# Patient Record
Sex: FEMALE | Race: WHITE | NOT HISPANIC OR LATINO | Employment: OTHER | URBAN - METROPOLITAN AREA
[De-identification: names, ages, dates, MRNs, and addresses within clinical notes are randomized per-mention and may not be internally consistent; named-entity substitution may affect disease eponyms.]

---

## 2017-05-02 ENCOUNTER — ALLSCRIPTS OFFICE VISIT (OUTPATIENT)
Dept: OTHER | Facility: OTHER | Age: 82
End: 2017-05-02

## 2017-10-10 ENCOUNTER — HOSPITAL ENCOUNTER (OUTPATIENT)
Dept: NON INVASIVE DIAGNOSTICS | Facility: CLINIC | Age: 82
Discharge: HOME | End: 2017-10-10
Payer: COMMERCIAL

## 2017-10-10 DIAGNOSIS — I73.9 PERIPHERAL VASCULAR DISEASE (HCC): ICD-10-CM

## 2017-10-10 PROCEDURE — 93925 LOWER EXTREMITY STUDY: CPT

## 2017-10-10 PROCEDURE — 93923 UPR/LXTR ART STDY 3+ LVLS: CPT

## 2017-10-10 PROCEDURE — 93978 VASCULAR STUDY: CPT

## 2017-10-12 ENCOUNTER — ALLSCRIPTS OFFICE VISIT (OUTPATIENT)
Dept: OTHER | Facility: OTHER | Age: 82
End: 2017-10-12

## 2017-10-13 NOTE — PROGRESS NOTES
Assessment  1  Asymptomatic bilateral carotid artery stenosis (433 10,433 30) (I65 23)  2  Bilateral edema of lower extremity (782 3) (R60 0)  3  Peripheral arterial disease (443 9) (I73 9)    Plan  Peripheral arterial disease    · VAS ABDOMINAL AORTA/ILIACS; COMPLETE STUDY; Status:Hold For - Scheduling;  Requested for:12Oct2018; Perform:[facility]; VPO:[id number];CXCWDLP; For:Peripheral arterial   disease; Ordered By:Tosin Vogt;   · VAS LOWER LIMB ARTERIAL DUPLEX, COMPLETE BILATERAL/GRAFTS; SIDE:Bilateral;  Status:Hold For - Scheduling; Requested for:12Oct2018; Perform:[facility]; RJM:[id number];PCBTBRW; For:Peripheral arterial   disease; Ordered By:Kevin Vogt;   · Follow-up visit in 1 year Evaluation and Treatment  Follow-up  Status: Hold For -  Scheduling  Requested for: 12Oct2017  Ordered; For: Peripheral arterial disease;  Ordered By: Kevin Vogt  Performed:   Due:   04IFI6868    Discussion/Summary  Discussion Summary:   Patient is a [de-identified] yo F w/ PAD s/p B fem-BK popliteal bypasses w/ in situ GSV as well as R femoral endarterectomy, and B EIA stent who presents to review recent imaging and RFMPADclaudication, rest pain, ulcerationsbypasses widely patent on DU; ABIs: 0 88/0 92; reviewed LEADs and AOIL; moderate L DESMOND stenosisnot pursue further intervention at this time 2/2 lack of symptoms and CKDAOIL and LEADs in 1 yearBLE venous insufficiencyvery compliant with compression stockings; please continue to wear these daily and elevate your legs whenever possible to improve swelling; use at least a weight of 15-20mmHg but you could go up to 20-30mmHg   Try buying a solid color stocking, either [employer] or [employer] brand as these are reliable, high quality stockings; put them on in the morning as soon as you wake upB asymptomatic ICA stenosis < 50%; with high grade ECA stenosis; discussed recommended yearly DU monitoring; however, patient is not interested in having carotid intervention at any time 2/2 aunt who had CVA associated with this procedure; she understands the risk of stroke if she developed high-grade stenosis; no further monitoringMedicationsASA, statin for best medical managementf/u in 1 year  Counseling Documentation With Imm: The patient was counseled regarding diagnostic results,-instructions for management,-prognosis,-patient and family education,-importance of compliance with treatment  Chief Complaint  Chief Complaint Free Text Note Form:  I am here to review my test results  Span is here to review her VIN/AOIL she had on 10/10/2017  Pt c/o swelling in both legs with the right being worse  Pt does wear compression stockings  Pt c/o discomfort from the swelling in her legs  She states the discomfort comes and goes depending on how swollen her legs get  Pt denies open wounds or sores  History of Present Illness  HPI: Patient is a [de-identified] yo F w/ PAD s/p B fem-BK popliteal bypasses (both in '12) w/ in situ GSV as well as R femoral endarterectomy (concurrent with bypass) , and B EIA stent who presents to review recent imaging and RFM  denies any claudication with walking  She walks slowly with a cane but can walk as far as she likes and is active, performed errands  She has significant edema of the legs but wears compression stockings daily and elevates whenever possible  Swelling is worse in the evening  She has a corn that appears to be a wart on her R plantar foot that she would like to get removed  She denies any hx of CVA or recent symptoms  takes ASA and statin daily  She is a non-smoker      Review of Systems  Complete Female - Vasc:   Constitutional: No fever or chills, feels well, no tiredness, no recent weight gain or weight loss  Eyes: dryness of the eyes,-no sudden vision loss-and-no double vision, but-no eye pain,-no eyesight problems,-eyes not red,-no purulent discharge from the eyes,-no itching of the eyes,-wears glasses-and-blurred vision  ENT: hoarseness, but-no earache,-no nosebleeds,-no sore throat,-no hearing loss-and-no nasal discharge  Cardiovascular: no painful veins,-palpitations,-no leg pain with walking-and-no bleeding veins, but-regular heart rate,-no chest pain-and-no intermittent leg claudication  Respiratory: no shortness of breath,-no wheezing,-no shortness of breath during exertion,-no orthopnea-and-no complaints of PND, but-cough  Gastrointestinal: No nausea, No vomiting, no diarrhea, no blood in stool  Genitourinary: no dysuria, no Hematuria,no urinary incontinence  Musculoskeletal: no lumbar pain,-joint stiffness-and-limb swelling, but-no joint swelling,-no limb pain-and-no myalgias  Integumentary: itching,-dry skin-and-no ulcers, but-no rashes,-no skin lesions-and-no skin wound  Neurological: no dementia-and-difficulty walking, but-no headache,-no numbness,-no confusion,-no dizziness,-no limb weakness,-no convulsions-and-no fainting  Psychiatric: anxiety-and-no mood disorder, but-no depression  Hematologic/Lymphatic: no bleeding disorder, no easy bruising  ROS Reviewed:   ROS reviewed  Active Problems  1  Abdominal aortic atherosclerosis (440 0) (I70 0)  2  Asymptomatic bilateral carotid artery stenosis (433 10,433 30) (I65 23)  3  Bilateral edema of lower extremity (782 3) (R60 0)  4  Chronic diastolic congestive heart failure (428 32,428 0) (I50 32)  5  Chronic kidney disease, stage 3 (585 3) (N18 3)  6  Dyslipidemia (272 4) (E78 5)  7  Hypertension (401 9) (I10)  8  Hypothyroidism (244 9) (E03 9)  9  Non-healing Surgical Wound (998 83)  10  Peripheral arterial disease (443 9) (I73 9)  11  Premature atrial contractions (427 61) (I49 1)    Past Medical History  Active Problems And Past Medical History Reviewed: The active problems and past medical history were reviewed and updated today  Surgical History  1  History of Bypass Graft Using Vein: Femoral-popliteal  2  History of Hemorrhoidectomy  3  History of Hysterectomy  4  History of Laminectomy Lumbar  5  History of Ovarian Cystectomy  6  History of Pilonidal Cyst Resection  7  History of PTA Iliac Initial Stenosis With Stent  Surgical History Reviewed: The surgical history was reviewed and updated today  Family History  Mother   1  Family history of Congestive Heart Failure  Father   2  Family history of Aortic Aneurysm  Family History Reviewed: The family history was reviewed and updated today  Social History   · Former smoker (V10 87) (T46 901)  Social History Reviewed: The social history was reviewed and updated today  The social history was reviewed and is unchanged  Current Meds  1  ALPRAZolam 0 25 MG Oral Tablet; PRN; Therapy: 46Yds9559 to (Evaluate:06Feb2014)  Requested for: 37KSQ0381 Recorded  2  Aspirin EC 81 MG Oral Tablet Delayed Release; TAKE 1 TABLET DAILY AS DIRECTED; Therapy: (Recorded:11Jan2013) to Recorded  3  Fiber Formula Oral Capsule; Therapy: 53ZMJ9552 to Recorded  4  Fish Oil 1000 MG Oral Capsule; TAKE 2 CAPSULE Daily; Therapy: (Recorded:17Lad8642) to Recorded  5  Furosemide 20 MG Oral Tablet; 1 PO BID; Therapy: 62LQV8791 to (Evaluate:11Apr2013)  Requested for: 54DRZ5524 Recorded  6  Lisinopril 40 MG Oral Tablet; TAKE 1 TABLET DAILY; Therapy: 54BBU1232 to (Evaluate:06Jan2014)  Requested for: 27EXH7694 Recorded  7  Multivitamins TABS; TAKE 1 TABLET DAILY; Therapy: 82FGS7682 to Recorded  8  Omega 3 1200 MG Oral Capsule; Therapy: 19ELV0888 to Recorded  9  Simvastatin 40 MG Oral Tablet; TAKE 1 TABLET Bedtime; Therapy: 95ALS7558 to (Evaluate:28Jun2015)  Requested for: 85IUU9395; Last   Rx:52Pth3174 Ordered  10  Spironolactone 25 MG Oral Tablet; TAKE 1 TABLET DAILY; Therapy: 32MHO2462 to (Evaluate:06Jan2014)  Requested for: 18IAA9848 Recorded  11  Synthroid 75 MCG Oral Tablet; TAKE 1 TABLET DAILY; Therapy: (Recorded:60Vbf4626) to Recorded  12  Zantac 150 Maximum Strength TABS; PRN;     Therapy: (Recorded:60Uiu9395) to Recorded  Medication List Reviewed: The medication list was reviewed and updated today  Allergies  1  Beta Adrenergic Blockers  2  HydroCHLOROthiazide TABS  3  Penicillins    Vitals  Vital Signs    Recorded: 75WTU9418 10:11AM   Temperature 97 4 F1   Heart Rate 74, L Radial1   Respiration Quality Normal1   Respiration 953   Systolic 981, LUE, Sitting1   Diastolic 78, LUE, Sitting1   Height 5 ft 5 in1   Weight 154 lb 6 oz1   BMI Calculated 25 691   BSA Calculated 1 771        1  Amended By: DoctorShari; Oct 12 2017 11:05 AM EST   Physical Exam    Carotid: no bruit heard on the right-and-no bruit on the left  Radial: right 2+-and-left 2+  Femoral: right 2+-and-left 2+  Popliteal: right 0-and-left 0  Posterior tibialis: right 0-and-left 0  Dorsalis pedis: right 0-and-left 0  Distal Pulse Exam:   There was a palpable graft pulse at   2+ R graft, 1+ L graft at knee  Normal Capillary Refill  Extremities: bilateral ankle 2+ pitting edema,-bilateral pretibial 2+ pitting edema-and-bilateral foot 1+ pitting edema  LE Varicose Veins: right leg reticular veins,-left leg reticular veins,-right leg spider veins-and-left leg spider veins  The heart rate was normal  The rhythm was regular  Heart sounds: normal S1-and-normal S2    Murmurs: No murmurs were heard  Pulmonary   Respiratory effort: No increased work of breathing or signs of respiratory distress  Auscultation of lungs: Clear to auscultation  No wheezing, no rales, no rhonchi  Abdomen   Abdomen: Abdomen soft, non-tender, no masses, non distended, no rebound tenderness  Psychiatric   Orientation to person, place and time: Normal    Mood and affect: Normal    Eyes   Conjunctiva and lids: No swelling, erythema, or discharge  Ears, Nose, Mouth, and Throat   Hearing: Normal    Neck   Neck: No jugular venous distention, normal ROM and extension  No cervical adenopathy, trachea midline, neck supple     Neurologic Sensory exam normal   Motor skills intact  Musculoskeletal   Gait and station:-with cane  Skin   Skin and subcutaneous tissue: -(small lesion appears to be a wart on R plantar foot at 1st met base without ulcer or skin breakdown)      Signatures   Electronically signed by : Brian Montoya MD; Oct 12 2017 10:08AM EST                       (Author)    Electronically signed by : Brian Montoya MD; Oct 12 2017 11:05AM EST                       (Author)

## 2017-11-20 ENCOUNTER — ALLSCRIPTS OFFICE VISIT (OUTPATIENT)
Dept: OTHER | Facility: OTHER | Age: 82
End: 2017-11-20

## 2017-11-21 NOTE — PROGRESS NOTES
Assessment  Assessed    1  Hypertension (401 9) (I10)   2  Dyslipidemia (272 4) (E78 5)   3  Chronic diastolic congestive heart failure (428 32,428 0) (I50 32)   4  Peripheral arterial disease (443 9) (I73 9)   5  Asymptomatic bilateral carotid artery stenosis (433 10,433 30) (I65 23)   6  Abdominal aortic atherosclerosis (440 0) (I70 0)   7  Premature atrial contractions (427 61) (I49 1)    Plan  Chronic diastolic congestive heart failure, Dyslipidemia, Hypertension    · Follow-up visit in 6 months Evaluation and Treatment  Follow-up  Status: Hold For -Scheduling  Requested for: 20Nov2017   Ordered;Chronic diastolic congestive heart failure, Dyslipidemia, Hypertension; Ordered By: Jerome Perry Performed:  Due: 34UBH1684    Discussion/Summary  Cardiology Discussion Summary Free Text Note Form [facility]:   Mrs Chen Palomino is a pleasant 60-year-old female who presents to the office today for routine follow-up  Since her last visit she has been feeling well and offers no cardiac complaints today  blood pressure is well controlled in the office today  No changes were made to her antihypertensive medication regimen  most recent lipids were reviewed  In light of her vascular disease her LDL is suboptimal  She had nausea with atorvastatin  I will wait for reassessment in the near future and if her LDL remains suboptimal I did discuss changing to rosuvastatin  terms of her heart failure, volume status appears stable  She will remain on her current diuretic regimen  She was instructed she can utilize additional Lasix as needed for signs or symptoms of volume overload  She continues to dialyze compression stockings  changes were made to her medication regimen  No testing is advised  follow-up in the office in six months or sooner if deemed necessary        History of Present Illness  Cardiology HPI Free Text Note Form [facility]: Mrs Chen Palomino is a pleasant 60-year-old female who presents to the office today for routine follow-up   her last visit she has been feeling well  She is sedentary but able to complete modest housework without any chest discomfort or shortness of breath  She denies any signs or symptoms of congestive heart failure including progressive lower extremity edema, paroxysmal nocturnal dyspnea, orthopnea, weight gain or increasing abdominal girth  She does not weigh herself regularly  She sleeps with her legs propped on pillows and notes they look well in the morning but does note increased edema throughout the day  She wears compression stockings which helps the edema  She recently changed to a 15-20 mmHg stocking which has helped  She reports an occasional fluttering sensation in her chest  She denies symptoms of claudication  Review of Systems  Cardiology Female ROS:    Cardiac: as noted in HPI  Skin: No complaints of nonhealing sores or skin rash  Genitourinary: No complaints of recurrent urinary tract infections, frequent urination at night, difficult urination, blood in urine, kidney stones, loss of bladder control, kidney problems, denies any birth control or hormone replacement, is not post menopausal, not currently pregnant  Psychological: as noted in HPI  General: as noted in HPI  Respiratory: as noted in HPI  HEENT: No complaints of serious problems, hearing problems, nose problems, throat problems, or snoring  Gastrointestinal: No complaints of liver problems, nausea, vomiting, heartburn, constipation, bloody stools, diarrhea, problems swallowing, adbominal pain, or rectal bleeding  Hematologic: No complaints of bleeding disorders, anemia, blood clots, or excessive brusing  Neurological: No complaints of numbness, tingling, dizziness, weakness, seizures, headaches, syncope or fainting, AM fatigue, daytime sleepiness, no witnessed apnea episodes  Musculoskeletal: No complaints of arthritis, back pain, or painfull swelling  Active Problems  Problems    1   Abdominal aortic atherosclerosis (440 0) (I70 0)   2  Asymptomatic bilateral carotid artery stenosis (433 10,433 30) (I65 23)   3  Bilateral edema of lower extremity (782 3) (R60 0)   4  Chronic diastolic congestive heart failure (428 32,428 0) (I50 32)   5  Chronic kidney disease, stage 3 (585 3) (N18 3)   6  Dyslipidemia (272 4) (E78 5)   7  Hypertension (401 9) (I10)   8  Hypothyroidism (244 9) (E03 9)   9  Non-healing Surgical Wound (998 83)   10  Peripheral arterial disease (443 9) (I73 9)   11  Premature atrial contractions (427 61) (I49 1)    Past Medical History  Active Problems And Past Medical History Reviewed: The active problems and past medical history were reviewed and updated today  Surgical History  Problems    1  History of Bypass Graft Using Vein: Femoral-popliteal   2  History of Hemorrhoidectomy   3  History of Hysterectomy   4  History of Laminectomy Lumbar   5  History of Ovarian Cystectomy   6  History of Pilonidal Cyst Resection   7  History of PTA Iliac Initial Stenosis With Stent  Surgical History Reviewed: The surgical history was reviewed and updated today  Family History  Mother    1  Family history of Congestive Heart Failure  Father    2  Family history of Aortic Aneurysm  Family History Reviewed: The family history was reviewed and updated today  Social History  Problems    · Former smoker (H74 80) (M81 150)  Social History Reviewed: The social history was reviewed and updated today  Current Meds   1  ALPRAZolam 0 25 MG Oral Tablet; PRN; Therapy: 83Rbm5415 to (Evaluate:00Mja5940)  Requested for: 25ZLR1110 Recorded   2  Aspirin EC 81 MG Oral Tablet Delayed Release; TAKE 1 TABLET DAILY AS DIRECTED; Therapy: (Recorded:63Gbc6050) to Recorded   3  Fiber Formula Oral Capsule; Therapy: 20YSU8499 to Recorded   4  Fish Oil 1000 MG Oral Capsule; TAKE 2 CAPSULE Daily; Therapy: (Recorded:63Scx8888) to Recorded   5  Furosemide 20 MG Oral Tablet; 1 PO BID;  Therapy: 03KQU4806 to (Evaluate:99Xqz2101)  Requested for: 62ZCC7218 Recorded   6  Lisinopril 40 MG Oral Tablet; TAKE 1 TABLET DAILY; Therapy: 32ZFP5640 to (Evaluate:06Jan2014)  Requested for: 01PSQ6543 Recorded   7  Multivitamins TABS; TAKE 1 TABLET DAILY; Therapy: 19QDH4895 to Recorded   8  Omega 3 1200 MG Oral Capsule; Therapy: 34BHD0795 to Recorded   9  Simvastatin 40 MG Oral Tablet; TAKE 1 TABLET Bedtime; Therapy: 74CXN9915 to (Evaluate:28Jun2015)  Requested for: 01STW0165; Last Rx:66Dgf0086 Ordered   10  Spironolactone 25 MG Oral Tablet; TAKE 1 TABLET DAILY; Therapy: 31RJM1402 to (Evaluate:06Jan2014)  Requested for: 77JDS8116 Recorded   11  Synthroid 75 MCG Oral Tablet; TAKE 1 TABLET DAILY; Therapy: (Recorded:09Qus2798) to Recorded   12  Zantac 150 Maximum Strength TABS; PRN; Therapy: (Recorded:55Yjy2093) to Recorded    Allergies  Medication    1  Beta Adrenergic Blockers   2  HydroCHLOROthiazide TABS   3  Penicillins    Vitals  Vital Signs    Recorded: 20Nov2017 10:41AM   Heart Rate 73   Systolic 953   Diastolic 62   Weight 920 lb 4 oz   BMI Calculated 25 67   BSA Calculated 1 77       Physical Exam   Constitutional  General appearance: No acute distress, well appearing and well nourished  Eyes  Conjunctiva and Sclera examination: Conjunctiva pink, sclera anicteric  Ears, Nose, Mouth, and Throat - Oropharynx: Clear, nares are clear, mucous membranes are moist   Neck  Neck and thyroid: Normal, supple, trachea midline, no thyromegaly  Pulmonary  Respiratory effort: No increased work of breathing or signs of respiratory distress  Auscultation of lungs: Clear to auscultation, no rales, no rhonchi, no wheezing, good air movement  Cardiovascular  Auscultation of heart: Normal rate and rhythm, normal S1 and S2, no murmurs  Carotid pulses: Normal, 2+ bilaterally  Peripheral vascular exam: Normal pulses throughout, no tenderness, erythema or swelling  Pedal pulses: Normal, 2+ bilaterally     Examination of extremities for edema and/or varicosities: Abnormal   bilateral pretibial 1+ pitting edema  Abdomen  Abdomen: Non-tender and no distention  Liver and spleen: No hepatomegaly or splenomegaly  Musculoskeletal Gait and station: Abnormal  Gait evaluation demonstrated ambulates with a cane  -- Digits and nails: Normal without clubbing or cyanosis  -- Inspection/palpation of joints, bones, and muscles: Normal, ROM normal    Skin - Skin and subcutaneous tissue: Normal without rashes or lesions  Skin is warm and well perfused, normal turgor  Neurologic - Cranial nerves: II - XII intact  -- Speech: Normal    Psychiatric - Orientation to person, place, and time: Normal -- Mood and affect: Normal       Results/Data  ECG Report: A 12 lead ECG was performed and was normal   Rhythm and rate:  normal sinus rhythm        Signatures   Electronically signed by : Umm Maldonado DO; Nov 20 2017 11:08AM EST                       (Author)

## 2018-01-12 VITALS
DIASTOLIC BLOOD PRESSURE: 78 MMHG | HEIGHT: 65 IN | BODY MASS INDEX: 25.72 KG/M2 | TEMPERATURE: 97.4 F | WEIGHT: 154.38 LBS | HEART RATE: 74 BPM | SYSTOLIC BLOOD PRESSURE: 132 MMHG | RESPIRATION RATE: 18 BRPM

## 2018-01-12 NOTE — CONSULTS
I had the pleasure of evaluating your patient, BERNICE LEBLANC  My full evaluation follows:      History of Present Illness  Mrs Leblanc is a pleasant 42-year-old female who presents to the office today for routine follow-up  Since her last visit she has been feeling well  She is sedentary but able to complete modest housework without any chest discomfort or shortness of breath  She denies any signs or symptoms of congestive heart failure including progressive lower extremity edema, paroxysmal nocturnal dyspnea, orthopnea, weight gain or increasing abdominal girth  She does not weigh herself regularly  She sleeps with her legs propped on pillows and notes they look well in the morning but does note increased edema throughout the day  She wears compression stockings which helps the edema  She recently changed to a 15-20 mmHg stocking which has helped  She reports an occasional fluttering sensation in her chest  She denies symptoms of claudication  Review of Systems      Cardiac: as noted in HPI  Skin: No complaints of nonhealing sores or skin rash  Genitourinary: No complaints of recurrent urinary tract infections, frequent urination at night, difficult urination, blood in urine, kidney stones, loss of bladder control, kidney problems, denies any birth control or hormone replacement, is not post menopausal, not currently pregnant  Psychological: as noted in HPI  General: as noted in HPI  Respiratory: as noted in HPI  HEENT: No complaints of serious problems, hearing problems, nose problems, throat problems, or snoring  Gastrointestinal: No complaints of liver problems, nausea, vomiting, heartburn, constipation, bloody stools, diarrhea, problems swallowing, adbominal pain, or rectal bleeding  Hematologic: No complaints of bleeding disorders, anemia, blood clots, or excessive brusing     Neurological: No complaints of numbness, tingling, dizziness, weakness, seizures, headaches, syncope or fainting, AM fatigue, daytime sleepiness, no witnessed apnea episodes  Musculoskeletal: No complaints of arthritis, back pain, or painfull swelling  Active Problems    1  Abdominal aortic atherosclerosis (440 0) (I70 0)   2  Asymptomatic bilateral carotid artery stenosis (433 10,433 30) (I65 23)   3  Bilateral edema of lower extremity (782 3) (R60 0)   4  Chronic diastolic congestive heart failure (428 32,428 0) (I50 32)   5  Chronic kidney disease, stage 3 (585 3) (N18 3)   6  Dyslipidemia (272 4) (E78 5)   7  Hypertension (401 9) (I10)   8  Hypothyroidism (244 9) (E03 9)   9  Non-healing Surgical Wound (998 83)   10  Peripheral arterial disease (443 9) (I73 9)   11  Premature atrial contractions (427 61) (I49 1)    Past Medical History    The active problems and past medical history were reviewed and updated today  Surgical History    · History of Bypass Graft Using Vein: Femoral-popliteal   · History of Hemorrhoidectomy   · History of Hysterectomy   · History of Laminectomy Lumbar   · History of Ovarian Cystectomy   · History of Pilonidal Cyst Resection   · History of PTA Iliac Initial Stenosis With Stent    The surgical history was reviewed and updated today  Family History    · Family history of Congestive Heart Failure    · Family history of Aortic Aneurysm    The family history was reviewed and updated today  Social History    · Former smoker (G23 63) (I95 100)  The social history was reviewed and updated today  Current Meds   1  ALPRAZolam 0 25 MG Oral Tablet; PRN; Therapy: 69Usn1750 to (Evaluate:53Yiy0733)  Requested for: 19XKU3047 Recorded   2  Aspirin EC 81 MG Oral Tablet Delayed Release; TAKE 1 TABLET DAILY AS DIRECTED; Therapy: (Recorded:11Jan2013) to Recorded   3  Fiber Formula Oral Capsule; Therapy: 56SHY2907 to Recorded   4  Fish Oil 1000 MG Oral Capsule; TAKE 2 CAPSULE Daily; Therapy: (Recorded:14Eeo1361) to Recorded   5  Furosemide 20 MG Oral Tablet; 1 PO BID;    Therapy: 38PKB2812 to (Evaluate:11Apr2013)  Requested for: 60IBM8913 Recorded   6  Lisinopril 40 MG Oral Tablet; TAKE 1 TABLET DAILY; Therapy: 28LTX7349 to (Evaluate:06Jan2014)  Requested for: 23CWZ3812 Recorded   7  Multivitamins TABS; TAKE 1 TABLET DAILY; Therapy: 23TLB1605 to Recorded   8  Omega 3 1200 MG Oral Capsule; Therapy: 15AYN9843 to Recorded   9  Simvastatin 40 MG Oral Tablet; TAKE 1 TABLET Bedtime; Therapy: 25HTC5205 to (Evaluate:28Jun2015)  Requested for: 84XFG2876; Last   Rx:31Dhk4706 Ordered   10  Spironolactone 25 MG Oral Tablet; TAKE 1 TABLET DAILY; Therapy: 79QOC9435 to (Evaluate:06Jan2014)  Requested for: 89ELU8715 Recorded   11  Synthroid 75 MCG Oral Tablet; TAKE 1 TABLET DAILY; Therapy: (Recorded:14Imm0998) to Recorded   12  Zantac 150 Maximum Strength TABS; PRN; Therapy: (Recorded:21Qud5362) to Recorded    Allergies    1  Beta Adrenergic Blockers   2  HydroCHLOROthiazide TABS   3  Penicillins    Vitals   Recorded: 20Nov2017 10:41AM   Heart Rate 73   Systolic 172   Diastolic 62   Weight 479 lb 4 oz   BMI Calculated 25 67   BSA Calculated 1 77     Physical Exam    Constitutional   General appearance: No acute distress, well appearing and well nourished  Eyes   Conjunctiva and Sclera examination: Conjunctiva pink, sclera anicteric  Ears, Nose, Mouth, and Throat - Oropharynx: Clear, nares are clear, mucous membranes are moist    Neck   Neck and thyroid: Normal, supple, trachea midline, no thyromegaly  Pulmonary   Respiratory effort: No increased work of breathing or signs of respiratory distress  Auscultation of lungs: Clear to auscultation, no rales, no rhonchi, no wheezing, good air movement  Cardiovascular   Auscultation of heart: Normal rate and rhythm, normal S1 and S2, no murmurs  Carotid pulses: Normal, 2+ bilaterally  Peripheral vascular exam: Normal pulses throughout, no tenderness, erythema or swelling  Pedal pulses: Normal, 2+ bilaterally      Examination of extremities for edema and/or varicosities: Abnormal   bilateral pretibial 1+ pitting edema  Abdomen   Abdomen: Non-tender and no distention  Liver and spleen: No hepatomegaly or splenomegaly  Musculoskeletal Gait and station: Abnormal  Gait evaluation demonstrated ambulates with a cane  Digits and nails: Normal without clubbing or cyanosis  Inspection/palpation of joints, bones, and muscles: Normal, ROM normal     Skin - Skin and subcutaneous tissue: Normal without rashes or lesions  Skin is warm and well perfused, normal turgor  Neurologic - Cranial nerves: II - XII intact  Speech: Normal     Psychiatric - Orientation to person, place, and time: Normal  Mood and affect: Normal       Results/Data  A 12 lead ECG was performed and was normal    Rhythm and rate:  normal sinus rhythm  Assessment    1  Hypertension (401 9) (I10)   2  Dyslipidemia (272 4) (E78 5)   3  Chronic diastolic congestive heart failure (428 32,428 0) (I50 32)   4  Peripheral arterial disease (443 9) (I73 9)   5  Asymptomatic bilateral carotid artery stenosis (433 10,433 30) (I65 23)   6  Abdominal aortic atherosclerosis (440 0) (I70 0)   7  Premature atrial contractions (427 61) (I49 1)    Plan  Chronic diastolic congestive heart failure, Dyslipidemia, Hypertension    · Follow-up visit in 6 months Evaluation and Treatment  Follow-up  Status: Hold For -  Scheduling  Requested for: 83HFP5880   Ordered; For: Chronic diastolic congestive heart failure, Dyslipidemia, Hypertension; Ordered By: Dot Norton Performed:  Due: 50PYV3710    Discussion/Summary    Mrs Cohn is a pleasant 44-year-old female who presents to the office today for routine follow-up  Since her last visit she has been feeling well and offers no cardiac complaints today  Her blood pressure is well controlled in the office today  No changes were made to her antihypertensive medication regimen  Her most recent lipids were reviewed   In light of her vascular disease her LDL is suboptimal  She had nausea with atorvastatin  I will wait for reassessment in the near future and if her LDL remains suboptimal I did discuss changing to rosuvastatin  In terms of her heart failure, volume status appears stable  She will remain on her current diuretic regimen  She was instructed she can utilize additional Lasix as needed for signs or symptoms of volume overload  She continues to dialyze compression stockings  No changes were made to her medication regimen  No testing is advised  She'll follow-up in the office in six months or sooner if deemed necessary  Thank you very much for allowing me to participate in the care of this patient  If you have any questions, please do not hesitate to contact me        Signatures   Electronically signed by : Viet Blanco DO; Nov 20 2017 11:08AM EST                       (Author)

## 2018-01-14 VITALS
WEIGHT: 156.38 LBS | BODY MASS INDEX: 26.05 KG/M2 | HEART RATE: 66 BPM | DIASTOLIC BLOOD PRESSURE: 68 MMHG | RESPIRATION RATE: 16 BRPM | HEIGHT: 65 IN | SYSTOLIC BLOOD PRESSURE: 148 MMHG

## 2018-01-14 VITALS
WEIGHT: 154.25 LBS | BODY MASS INDEX: 25.67 KG/M2 | SYSTOLIC BLOOD PRESSURE: 128 MMHG | HEART RATE: 73 BPM | DIASTOLIC BLOOD PRESSURE: 62 MMHG

## 2018-04-19 LAB
ALBUMIN SERPL-MCNC: 4.2 G/DL (ref 3.6–5.1)
ALBUMIN/GLOB SERPL: 1.6 (CALC) (ref 1–2.5)
ALP SERPL-CCNC: 55 U/L (ref 33–130)
ALT SERPL-CCNC: 13 U/L (ref 6–29)
AST SERPL-CCNC: 20 U/L (ref 10–35)
BASOPHILS # BLD AUTO: 72 CELLS/UL (ref 0–200)
BASOPHILS NFR BLD AUTO: 1.6 %
BILIRUB SERPL-MCNC: 0.6 MG/DL (ref 0.2–1.2)
BUN SERPL-MCNC: 51 MG/DL (ref 7–25)
BUN/CREAT SERPL: 18 (CALC) (ref 6–22)
CALCIUM SERPL-MCNC: 9.7 MG/DL (ref 8.6–10.4)
CHLORIDE SERPL-SCNC: 109 MMOL/L (ref 98–110)
CHOLEST SERPL-MCNC: 147 MG/DL
CHOLEST/HDLC SERPL: 3.6 (CALC)
CO2 SERPL-SCNC: 25 MMOL/L (ref 20–31)
CREAT SERPL-MCNC: 2.83 MG/DL (ref 0.6–0.88)
CREAT UR-MCNC: 205 MG/DL (ref 20–320)
EOSINOPHIL # BLD AUTO: 149 CELLS/UL (ref 15–500)
EOSINOPHIL NFR BLD AUTO: 3.3 %
ERYTHROCYTE [DISTWIDTH] IN BLOOD BY AUTOMATED COUNT: 12.3 % (ref 11–15)
GLOBULIN SER CALC-MCNC: 2.6 G/DL (CALC) (ref 1.9–3.7)
GLUCOSE SERPL-MCNC: 100 MG/DL (ref 65–99)
HCT VFR BLD AUTO: 33.1 % (ref 35–45)
HDLC SERPL-MCNC: 41 MG/DL
HGB BLD-MCNC: 11 G/DL (ref 11.7–15.5)
LDLC SERPL CALC-MCNC: 81 MG/DL (CALC)
LYMPHOCYTES # BLD AUTO: 1332 CELLS/UL (ref 850–3900)
LYMPHOCYTES NFR BLD AUTO: 29.6 %
MAGNESIUM SERPL-MCNC: 2.5 MG/DL (ref 1.5–2.5)
MCH RBC QN AUTO: 32.5 PG (ref 27–33)
MCHC RBC AUTO-ENTMCNC: 33.2 G/DL (ref 32–36)
MCV RBC AUTO: 97.9 FL (ref 80–100)
MONOCYTES # BLD AUTO: 500 CELLS/UL (ref 200–950)
MONOCYTES NFR BLD AUTO: 11.1 %
NEUTROPHILS # BLD AUTO: 2448 CELLS/UL (ref 1500–7800)
NEUTROPHILS NFR BLD AUTO: 54.4 %
NONHDLC SERPL-MCNC: 106 MG/DL (CALC)
PLATELET # BLD AUTO: 194 THOUSAND/UL (ref 140–400)
PMV BLD REES-ECKER: 11.4 FL (ref 7.5–12.5)
POTASSIUM SERPL-SCNC: 4.7 MMOL/L (ref 3.5–5.3)
PROT SERPL-MCNC: 6.8 G/DL (ref 6.1–8.1)
PROT UR-MCNC: 14 MG/DL (ref 5–24)
PROT/CREAT UR: 68 MG/G CREAT (ref 21–161)
RBC # BLD AUTO: 3.38 MILLION/UL (ref 3.8–5.1)
SL AMB EGFR AFRICAN AMERICAN: 17 ML/MIN/1.73M2
SL AMB EGFR NON AFRICAN AMERICAN: 15 ML/MIN/1.73M2
SODIUM SERPL-SCNC: 143 MMOL/L (ref 135–146)
TRIGL SERPL-MCNC: 153 MG/DL
TSH SERPL-ACNC: 1.05 MIU/L (ref 0.4–4.5)
WBC # BLD AUTO: 4.5 THOUSAND/UL (ref 3.8–10.8)

## 2018-05-01 PROBLEM — I50.32 CHRONIC DIASTOLIC CHF (CONGESTIVE HEART FAILURE) (HCC): Status: ACTIVE | Noted: 2017-08-25

## 2018-05-01 PROBLEM — I74.09 AORTOILIAC OCCLUSIVE DISEASE (HCC): Status: ACTIVE | Noted: 2018-05-01

## 2018-05-02 ENCOUNTER — OFFICE VISIT (OUTPATIENT)
Dept: VASCULAR SURGERY | Facility: CLINIC | Age: 83
End: 2018-05-02
Payer: COMMERCIAL

## 2018-05-02 VITALS
SYSTOLIC BLOOD PRESSURE: 160 MMHG | DIASTOLIC BLOOD PRESSURE: 74 MMHG | HEART RATE: 68 BPM | RESPIRATION RATE: 16 BRPM | HEIGHT: 65 IN | WEIGHT: 154 LBS | BODY MASS INDEX: 25.66 KG/M2

## 2018-05-02 DIAGNOSIS — I74.09 AORTOILIAC OCCLUSIVE DISEASE (HCC): ICD-10-CM

## 2018-05-02 DIAGNOSIS — I73.9 PAD (PERIPHERAL ARTERY DISEASE) (HCC): Primary | ICD-10-CM

## 2018-05-02 DIAGNOSIS — E78.5 DYSLIPIDEMIA: ICD-10-CM

## 2018-05-02 DIAGNOSIS — N18.30 CHRONIC KIDNEY DISEASE, STAGE 3 (HCC): ICD-10-CM

## 2018-05-02 PROCEDURE — 99214 OFFICE O/P EST MOD 30 MIN: CPT | Performed by: PHYSICIAN ASSISTANT

## 2018-05-02 RX ORDER — OMEGA-3-ACID ETHYL ESTERS 1 G/1
2 CAPSULE, LIQUID FILLED ORAL 2 TIMES DAILY
COMMUNITY
End: 2018-08-31

## 2018-05-02 RX ORDER — ALPRAZOLAM 0.25 MG/1
TABLET ORAL
COMMUNITY

## 2018-05-02 RX ORDER — LISINOPRIL 40 MG/1
40 TABLET ORAL DAILY
COMMUNITY

## 2018-05-02 RX ORDER — ASPIRIN 81 MG/1
81 TABLET ORAL DAILY
COMMUNITY

## 2018-05-02 RX ORDER — FUROSEMIDE 20 MG/1
20 TABLET ORAL 2 TIMES DAILY
COMMUNITY

## 2018-05-02 RX ORDER — SIMVASTATIN 40 MG
40 TABLET ORAL
COMMUNITY

## 2018-05-02 RX ORDER — DIPHENOXYLATE HYDROCHLORIDE AND ATROPINE SULFATE 2.5; .025 MG/1; MG/1
1 TABLET ORAL DAILY
COMMUNITY

## 2018-05-02 RX ORDER — LEVOTHYROXINE SODIUM 0.07 MG/1
75 TABLET ORAL DAILY
COMMUNITY

## 2018-05-02 NOTE — PROGRESS NOTES
Peripheral arterial disease [facility])  82-year-old female with a history of CKD III and aortoiliac and infrainguinal occlusive disease, s/p R CAF endarterectomy, bilateral fem-BK pop bypass with in situ GSV '12 and bilateral EIA stents who returns to the office with new symptoms of ill-defined BLE  numbness x 18 days  Previous noninvasive imaging studies demonstrated patent iliac stents and bypass grafts but moderate L DESMOND disease  The patient was asymptomatic at that time  The patient's present symptoms may be neuropathic in nature considering known lumbar spinal disease and stable clinical exam   Will obtain repeat AOIL and VIN for completeness and follow-up with Dr Chantale Jose Doctor for reassessment and review of imaging studies  Continue aspirin and statin therapy  Patient is instructed to contact the office in the interim with any questions, concerns or worsening of her symptoms  Aortoiliac occlusive disease ([facility] [address] )  -per outlined plan    Dyslipidemia  -continue statin therapy      Assessment/Plan   Diagnoses and all orders for this visit:    PAD (peripheral artery disease) (HCC)  -     VAS lower limb arterial duplex, complete bilateral; Future    Aortoiliac occlusive disease (HCC)  -     VAS abdominal aorta/iliacs; complete study; Future    Dyslipidemia    Chronic kidney disease, stage 3    Other orders  -     levothyroxine 75 mcg tablet; Take 75 mcg by mouth daily  -     lisinopril (ZESTRIL) 40 mg tablet; Take 40 mg by mouth daily  -     furosemide (LASIX) 20 mg tablet; Take 20 mg by mouth 2 (two) times a day  -     simvastatin (ZOCOR) 40 mg tablet; Take 40 mg by mouth daily at bedtime  -     ALPRAZolam (XANAX) 0 25 mg tablet; Take by mouth daily at bedtime as needed for anxiety  -     omega-3-acid ethyl esters (LOVAZA) 1 g capsule; Take 2 g by mouth 2 (two) times a day  -     aspirin (ECOTRIN LOW STRENGTH) 81 mg EC tablet;  Take 81 mg by mouth daily  -     multivitamin (THERAGRAN) TABS; Take 1 tablet by mouth daily        Chief Complaint   Patient presents with    Follow-up     Eval DEXTER numbness with the right being worse  Pt c/o soreness to her right upper thigh area  Pt has had no recent testing done  Subjective   Patient ID: Ozzie Bertrand is a 82 y o  female  51-year-old female with a history HTN, HLD, hypothyroidism, CKD III, chronic diastolic heart failure, asymptomatic <50% bilateral carotid artery stenosis and aortoiliac and infrainguinal arterial occlusive disease, s/p R CFA endarterectomy and R fem-BK pop bypass with in situ GSV 9/25/2012, left fem-AKA pop bypass with in situ GSV 1/19/2012 and bilateral EIA stents who returns to the office for complaints of new bilateral lower extremity numbness x 18 days  Patient reports awakening in the morning 18 days ago with bilateral thigh numbness extending to the feet without inciting event  It was not associated with lower extremity pain, weakness or cool sensation  This has persisted but now is primarily isolated to the feet  She denies claudication, rest pain or tissue loss  The patient is followed by Dr Moises Garcia Doctor related to her known vascular disease and was last seen in the office on 10/12/2017  AOIL at that time demonstrated moderate left DESMOND disease but the patient was asymptomatic  Repeat imaging and follow-up was scheduled for October 2018  Patient is on aspirin and statin therapy  Patient has a known history of lumbar spinal disease but denies having similar symptoms in the past         The following portions of the patient's history were reviewed and updated as appropriate: allergies, current medications, past family history, past medical history, past social history, past surgical history and problem list     Review of Systems   Constitutional: Negative  HENT: Negative  Eyes: Positive for itching  Respiratory: Positive for cough  Cardiovascular: Positive for palpitations and leg swelling  Gastrointestinal: Negative  Endocrine: Positive for cold intolerance and polyuria  Genitourinary: Negative  Musculoskeletal: Positive for back pain, gait problem and joint swelling  Leg pain  Leg cramping when walking  Allergic/Immunologic: Positive for environmental allergies  Neurological: Negative for dizziness, tremors, seizures, syncope, facial asymmetry, speech difficulty, weakness, light-headedness, numbness and headaches  Hematological: Negative  Psychiatric/Behavioral: Negative  Patient Active Problem List   Diagnosis    Asymptomatic bilateral carotid artery stenosis    Bilateral edema of lower extremity    Chronic diastolic CHF (congestive heart failure) ([place] [address] )    Chronic kidney disease, stage 3    Dyslipidemia    Benign essential hypertension    Hypothyroidism    Peripheral arterial disease ([place] [address] )    Congenital hypothyroidism without goiter    Aortoiliac occlusive disease ([place] [address] )       Past Surgical History:   Procedure Laterality Date    ABDOMINAL AORTA STENT      ANGIOPLASTY Left 2012    left leg    ARTERIAL BYPASS SURGERY Left 2012    left leg    ARTERIAL BYPASS SURGERY Right 2012    rightleg   [address]    LUMBAR SPINE SURGERY  2011    MYOMECTOMY  1980    OVARIAN CYST REMOVAL  1976       Family History   Problem Relation Age of Onset    Heart failure Mother     Heart attack Father        Social History     Social History    Marital status:      Spouse name: N/A    Number of children: N/A    Years of education: N/A     Occupational History    Not on file       Social History Main Topics    Smoking status: Never Smoker    Smokeless tobacco: Never Used    Alcohol use No    Drug use: No    Sexual activity: Not on file     Other Topics Concern    Not on file     Social History Narrative    No narrative on file       Allergies   Allergen Reactions    Metoprolol Other (See Comments) and Shortness Of Breath     WEIGHT GAIN    Atorvastatin      Other reaction(s): Other (See Comments)  Muscle spasms    Beta Adrenergic Blockers     Hydrochlorothiazide     Penicillins          Current Outpatient Prescriptions:     ALPRAZolam (XANAX) 0 25 mg tablet, Take by mouth daily at bedtime as needed for anxiety, Disp: , Rfl:     aspirin (ECOTRIN LOW STRENGTH) 81 mg EC tablet, Take 81 mg by mouth daily, Disp: , Rfl:     furosemide (LASIX) 20 mg tablet, Take 20 mg by mouth 2 (two) times a day, Disp: , Rfl:     levothyroxine 75 mcg tablet, Take 75 mcg by mouth daily, Disp: , Rfl:     lisinopril (ZESTRIL) 40 mg tablet, Take 40 mg by mouth daily, Disp: , Rfl:     multivitamin (THERAGRAN) TABS, Take 1 tablet by mouth daily, Disp: , Rfl:     omega-3-acid ethyl esters (LOVAZA) 1 g capsule, Take 2 g by mouth 2 (two) times a day, Disp: , Rfl:     simvastatin (ZOCOR) 40 mg tablet, Take 40 mg by mouth daily at bedtime, Disp: , Rfl:     Objective      Imaging studies:  VIN and AOIL from 10/10/2017 reviewed and as noted above  No recent imaging studies for review    Physical Exam:    General appearance: alert and oriented, in no acute distress  Skin: Skin color, texture, turgor normal  No rashes or lesions  Neurologic: Grossly normal  Head: Normocephalic, without obvious abnormality, atraumatic  Eyes: PERRL, EOMI, sclerae nonicteric  Throat: lips, mucosa, and tongue normal; teeth and gums normal  Neck: no adenopathy, no carotid bruit, no JVD, supple, symmetrical, trachea midline and thyroid not enlarged, symmetric, no tenderness/mass/nodules  Back: symmetric, no curvature  ROM normal  No CVA tenderness  Lungs: clear to auscultation bilaterally  Chest wall: no tenderness  Heart: regular rate and rhythm, S1, S2 normal, no murmur, click, rub or gallop  Abdomen: soft, non-tender; bowel sounds normal; no masses,  no organomegaly and Nondistended  No abdominal bruits    No femoral bruits  Extremities: extremities normal, warm and well-perfused; no cyanosis, clubbing, or edema, no ulcers, gangrene or trophic changes and Old lower extremity bypass scars well healed  Bilateral lower extremities motor and sensory intact  No mottling or trash foot  Palpable graft pulse at the knee bilaterally      Pulse exam:  Radial: Right: 2+ Left[de-identified] 2+  Femoral: Right: 2+ Left: 2+  Popliteal: Right: non-palpable Left: non-palpable  DP: Right: doppler signal Left: 1+  PT: Right: doppler signal Left: doppler signal   Doppler signals:  Right: biphasic PT, DP, AT, peroneal   Left:  Biphasic PT, DP, AT, peroneal   2+ graft pulse bilaterally at medial knee

## 2018-05-02 NOTE — ASSESSMENT & PLAN NOTE
28-year-old female with a history of CKD III and aortoiliac and infrainguinal occlusive disease, s/p R CAF endarterectomy, bilateral fem-BK pop bypass with in situ GSV '12 and bilateral EIA stents who returns to the office with new symptoms of ill-defined BLE  numbness x 18 days  Previous noninvasive imaging studies demonstrated patent iliac stents and bypass grafts but moderate L DESMOND disease  The patient was asymptomatic at that time  The patient's present symptoms may be neuropathic in nature considering known lumbar spinal disease and stable clinical exam   Will obtain repeat AOIL and VIN for completeness and follow-up with Dr Kristy Cuello Doctor for reassessment and review of imaging studies  Continue aspirin and statin therapy  Patient is instructed to contact the office in the interim with any questions, concerns or worsening of her symptoms

## 2018-05-02 NOTE — PATIENT INSTRUCTIONS
-we will obtain a repeat abdominal ultrasound and lower extremity arterial Dopplers for completeness to workup your new symptoms of lower extremity numbness  -return to office with Dr Ray Whalen Doctor after obtaining imaging studies for review of imaging studies and treatment plan  -please contact the office in the interim with any questions, concerns or change in symptoms  -continue all your scheduled medications, including aspirin and Zocor

## 2018-05-02 NOTE — LETTER
May 2, 2018     Arelis Mcgee DO  [address]    Patient: Elie Jorgensen   YOB: 1935   Date of Visit: 5/2/2018       Dear Dr Savita Daniel: Thank you for referring Lorri Cohn to me for evaluation  Below are my notes for this consultation  If you have questions, please do not hesitate to call me  I look forward to following your patient along with you  Sincerely,        Maggie Lindo PA-C        CC: No Recipients  Giulia Coello  5/2/2018  4:10 PM  Sign at close encounter  Peripheral arterial disease [facility])  79-year-old female with a history of CKD III and aortoiliac and infrainguinal occlusive disease, s/p R CAF endarterectomy, bilateral fem-BK pop bypass with in situ GSV '12 and bilateral EIA stents who returns to the office with new symptoms of ill-defined BLE  numbness x 18 days  Previous noninvasive imaging studies demonstrated patent iliac stents and bypass grafts but moderate L DESMOND disease  The patient was asymptomatic at that time  The patient's present symptoms may be neuropathic in nature considering known lumbar spinal disease and stable clinical exam   Will obtain repeat AOIL and VIN for completeness and follow-up with Dr Shyanne Lucas Doctor for reassessment and review of imaging studies  Continue aspirin and statin therapy  Patient is instructed to contact the office in the interim with any questions, concerns or worsening of her symptoms  Aortoiliac occlusive disease ([facility] [address] )  -per outlined plan    Dyslipidemia  -continue statin therapy      Assessment/Plan   Diagnoses and all orders for this visit:    PAD (peripheral artery disease) (HCC)  -     VAS lower limb arterial duplex, complete bilateral; Future    Aortoiliac occlusive disease (HCC)  -     VAS abdominal aorta/iliacs; complete study; Future    Dyslipidemia    Chronic kidney disease, stage 3    Other orders  -     levothyroxine 75 mcg tablet;  Take 75 mcg by mouth daily  - lisinopril (ZESTRIL) 40 mg tablet; Take 40 mg by mouth daily  -     furosemide (LASIX) 20 mg tablet; Take 20 mg by mouth 2 (two) times a day  -     simvastatin (ZOCOR) 40 mg tablet; Take 40 mg by mouth daily at bedtime  -     ALPRAZolam (XANAX) 0 25 mg tablet; Take by mouth daily at bedtime as needed for anxiety  -     omega-3-acid ethyl esters (LOVAZA) 1 g capsule; Take 2 g by mouth 2 (two) times a day  -     aspirin (ECOTRIN LOW STRENGTH) 81 mg EC tablet; Take 81 mg by mouth daily  -     multivitamin (THERAGRAN) TABS; Take 1 tablet by mouth daily        Chief Complaint   Patient presents with    Follow-up     Eval DEXTER numbness with the right being worse  Pt c/o soreness to her right upper thigh area  Pt has had no recent testing done  Subjective   Patient ID: Niraj Fiore is a 82 y o  female  68-year-old female with a history HTN, HLD, hypothyroidism, CKD III, chronic diastolic heart failure, asymptomatic <50% bilateral carotid artery stenosis and aortoiliac and infrainguinal arterial occlusive disease, s/p R CFA endarterectomy and R fem-BK pop bypass with in situ GSV 9/25/2012, left fem-AKA pop bypass with in situ GSV 1/19/2012 and bilateral EIA stents who returns to the office for complaints of new bilateral lower extremity numbness x 18 days  Patient reports awakening in the morning 18 days ago with bilateral thigh numbness extending to the feet without inciting event  It was not associated with lower extremity pain, weakness or cool sensation  This has persisted but now is primarily isolated to the feet  She denies claudication, rest pain or tissue loss  The patient is followed by Dr Dave Hitchcock Doctor related to her known vascular disease and was last seen in the office on 10/12/2017  AOIL at that time demonstrated moderate left DESMOND disease but the patient was asymptomatic  Repeat imaging and follow-up was scheduled for October 2018  Patient is on aspirin and statin therapy    Patient has a known history of lumbar spinal disease but denies having similar symptoms in the past         The following portions of the patient's history were reviewed and updated as appropriate: allergies, current medications, past family history, past medical history, past social history, past surgical history and problem list     Review of Systems   Constitutional: Negative  HENT: Negative  Eyes: Positive for itching  Respiratory: Positive for cough  Cardiovascular: Positive for palpitations and leg swelling  Gastrointestinal: Negative  Endocrine: Positive for cold intolerance and polyuria  Genitourinary: Negative  Musculoskeletal: Positive for back pain, gait problem and joint swelling  Leg pain  Leg cramping when walking  Allergic/Immunologic: Positive for environmental allergies  Neurological: Negative for dizziness, tremors, seizures, syncope, facial asymmetry, speech difficulty, weakness, light-headedness, numbness and headaches  Hematological: Negative  Psychiatric/Behavioral: Negative          Patient Active Problem List   Diagnosis    Asymptomatic bilateral carotid artery stenosis    Bilateral edema of lower extremity    Chronic diastolic CHF (congestive heart failure) ([facility] [address] )    Chronic kidney disease, stage 3    Dyslipidemia    Benign essential hypertension    Hypothyroidism    Peripheral arterial disease ([facility] [address] )    Congenital hypothyroidism without goiter    [facility]       Past Surgical History:   Procedure Laterality Date    ABDOMINAL AORTA STENT      ANGIOPLASTY Left 2012    left leg    ARTERIAL BYPASS SURGERY Left 2012    left leg    ARTERIAL BYPASS SURGERY Right 2012    [address] SURGERY  2011    MYOMECTOMY  1980    OVARIAN CYST REMOVAL  1976       Family History   Problem Relation Age of Onset    Heart failure Mother     Heart attack Father        Social History     Social History    Marital status:      Spouse name: N/A    Number of children: N/A    Years of education: N/A     Occupational History    Not on file  Social History Main Topics    Smoking status: Never Smoker    Smokeless tobacco: Never Used    Alcohol use No    Drug use: No    Sexual activity: Not on file     Other Topics Concern    Not on file     Social History Narrative    No narrative on file       Allergies   Allergen Reactions    Metoprolol Other (See Comments) and Shortness Of Breath     WEIGHT GAIN    Atorvastatin      Other reaction(s): Other (See Comments)  Muscle spasms    Beta Adrenergic Blockers     Hydrochlorothiazide     Penicillins          Current Outpatient Prescriptions:     ALPRAZolam (XANAX) 0 25 mg tablet, Take by mouth daily at bedtime as needed for anxiety, Disp: , Rfl:     aspirin (ECOTRIN LOW STRENGTH) 81 mg EC tablet, Take 81 mg by mouth daily, Disp: , Rfl:     furosemide (LASIX) 20 mg tablet, Take 20 mg by mouth 2 (two) times a day, Disp: , Rfl:     levothyroxine 75 mcg tablet, Take 75 mcg by mouth daily, Disp: , Rfl:     lisinopril (ZESTRIL) 40 mg tablet, Take 40 mg by mouth daily, Disp: , Rfl:     multivitamin (THERAGRAN) TABS, Take 1 tablet by mouth daily, Disp: , Rfl:     omega-3-acid ethyl esters (LOVAZA) 1 g capsule, Take 2 g by mouth 2 (two) times a day, Disp: , Rfl:     simvastatin (ZOCOR) 40 mg tablet, Take 40 mg by mouth daily at bedtime, Disp: , Rfl:     Objective      Imaging studies:  VIN and AOIL from 10/10/2017 reviewed and as noted above   No recent imaging studies for review    Physical Exam:    General appearance: alert and oriented, in no acute distress  Skin: Skin color, texture, turgor normal  No rashes or lesions  Neurologic: Grossly normal  Head: Normocephalic, without obvious abnormality, atraumatic  Eyes: PERRL, EOMI, sclerae nonicteric  Throat: lips, mucosa, and tongue normal; teeth and gums normal  Neck: no adenopathy, no carotid bruit, no JVD, supple, symmetrical, trachea midline and thyroid not enlarged, symmetric, no tenderness/mass/nodules  Back: symmetric, no curvature  ROM normal  No CVA tenderness  Lungs: clear to auscultation bilaterally  Chest wall: no tenderness  Heart: regular rate and rhythm, S1, S2 normal, no murmur, click, rub or gallop  Abdomen: soft, non-tender; bowel sounds normal; no masses,  no organomegaly and Nondistended  No abdominal bruits  No femoral bruits  Extremities: extremities normal, warm and well-perfused; no cyanosis, clubbing, or edema, no ulcers, gangrene or trophic changes and Old lower extremity bypass scars well healed  Bilateral lower extremities motor and sensory intact  No mottling or trash foot  Palpable graft pulse at the knee bilaterally      Pulse exam:  Radial: Right: 2+ Left[de-identified] 2+  Femoral: Right: 2+ Left: 2+  Popliteal: Right: non-palpable Left: non-palpable  DP: Right: doppler signal Left: 1+  PT: Right: doppler signal Left: doppler signal   Doppler signals:  Right: biphasic PT, DP, AT, peroneal   Left:  Biphasic PT, DP, AT, peroneal   2+ graft pulse bilaterally at medial knee

## 2018-06-01 DIAGNOSIS — I73.9 PAD (PERIPHERAL ARTERY DISEASE) (HCC): ICD-10-CM

## 2018-06-01 DIAGNOSIS — I74.09 AORTOILIAC OCCLUSIVE DISEASE (HCC): Primary | ICD-10-CM

## 2018-06-13 ENCOUNTER — HOSPITAL ENCOUNTER (OUTPATIENT)
Dept: NON INVASIVE DIAGNOSTICS | Facility: CLINIC | Age: 83
Discharge: HOME | End: 2018-06-13
Payer: COMMERCIAL

## 2018-06-13 DIAGNOSIS — I73.9 PAD (PERIPHERAL ARTERY DISEASE) (HCC): ICD-10-CM

## 2018-06-13 DIAGNOSIS — I74.09 AORTOILIAC OCCLUSIVE DISEASE (HCC): ICD-10-CM

## 2018-06-13 PROCEDURE — 93978 VASCULAR STUDY: CPT

## 2018-06-13 PROCEDURE — 93925 LOWER EXTREMITY STUDY: CPT

## 2018-06-13 PROCEDURE — 93923 UPR/LXTR ART STDY 3+ LVLS: CPT

## 2018-06-14 PROCEDURE — 93978 VASCULAR STUDY: CPT | Performed by: SURGERY

## 2018-06-14 PROCEDURE — 93925 LOWER EXTREMITY STUDY: CPT | Performed by: SURGERY

## 2018-06-14 PROCEDURE — 93922 UPR/L XTREMITY ART 2 LEVELS: CPT | Performed by: SURGERY

## 2018-06-18 ENCOUNTER — OFFICE VISIT (OUTPATIENT)
Dept: VASCULAR SURGERY | Facility: CLINIC | Age: 83
End: 2018-06-18
Payer: COMMERCIAL

## 2018-06-18 VITALS
BODY MASS INDEX: 27.66 KG/M2 | HEART RATE: 78 BPM | RESPIRATION RATE: 16 BRPM | HEIGHT: 65 IN | SYSTOLIC BLOOD PRESSURE: 160 MMHG | TEMPERATURE: 97.9 F | DIASTOLIC BLOOD PRESSURE: 80 MMHG | WEIGHT: 166 LBS

## 2018-06-18 DIAGNOSIS — I73.9 PERIPHERAL ARTERIAL DISEASE (HCC): Primary | ICD-10-CM

## 2018-06-18 DIAGNOSIS — R60.0 BILATERAL EDEMA OF LOWER EXTREMITY: ICD-10-CM

## 2018-06-18 DIAGNOSIS — I65.23 ASYMPTOMATIC BILATERAL CAROTID ARTERY STENOSIS: ICD-10-CM

## 2018-06-18 DIAGNOSIS — I74.09 AORTOILIAC OCCLUSIVE DISEASE (HCC): ICD-10-CM

## 2018-06-18 PROCEDURE — 99215 OFFICE O/P EST HI 40 MIN: CPT | Performed by: SURGERY

## 2018-06-18 RX ORDER — SPIRONOLACTONE 25 MG/1
25 TABLET ORAL DAILY
COMMUNITY

## 2018-06-18 NOTE — PATIENT INSTRUCTIONS
1) PAD  -you have had bypasses and stents on both legs to treat your peripheral vascular disease  -this all looks good on your most recent testing  -if you start getting cramps in the legs when you walk or pain in the feet at night when trying to go to sleep, please see us right away  -we will get your next ultrasounds in q year    2) Leg edema  -continue to wear your compression stockings daily; make sure these are at least 20-30mmHg in strength

## 2018-06-18 NOTE — PROGRESS NOTES
Assessment/Plan:    Pt is an 83 yo F w/ hypothyroidism, CHF, HTN, CKD, HLD, BLE edema, asymptomatic B ICA stenosis, PAD, presents to review imaging  Peripheral arterial disease (HCC)  Aortoiliac occlusive disease (HCC)  -     VAS lower limb arterial duplex, complete bilateral; Future  -     VAS abdominal aorta/iliacs; complete study; Future  -s/p R femoral endart, fem-BK pop bypass in situ '12 and s/p L fem-? AK? Pop bypass in situ '12 with R DESMOND and L EIA stents  -denies claudication/rest pain/wounds at this time despite myriad of other musculoskeletal symptoms  -reviewed AOIL and LEADs which show R: 0 93/115/67 and L: 0 88/1316/88 with patent iliac stents and bypass grafts  -will continue surveillance with year LEADs and AOIL  -patient to call right away if she develops sxs of claudication/rest pain/wounds    Asymptomatic bilateral carotid artery stenosis  -no TIA/CVA symptoms  -B ICA < 50% stenosis with high grade ECA stenosis  -at last visit, patient reported refusal of ever having CEA 2/2 aunt who had complication of CVA from this; discontinued monitoring at that time; understands risk of CVA with high-grade stenosis    Bilateral edema of lower extremity  -most likely due to combination of venous insufficiency and CHF  -she is very uncomfortable when swelling increases and should be consistent with medical management  -recommend compression stockings 20-30mmHg daily, leg elevation, increased activity; she also need to continue taking diuretics for CHF per her cardiologist/PCP    Medications  Cont ASA and statin as well has cardiac meds/spironolactone    F/u: 1 year    Subjective:      Patient ID: Mirtha Moncada is a 82 y o  female  Patient had VIN and AOIL on 6/13  H/o of revascularization bilat  Patient is c/o intermittent numbness in the bilat legs that has now resolved  She felt it was related to increased swelling in her legs at the time  She denies pain in the bilat legs  She denies stomach pain   She has chronic back pain  She denies any open wounds or sores  HPI:    Patient has a history of BLE bypasses and stenting  Patient can't remember what initial indication was  She continues to be followed for surveillance of this  Several weeks ago, she noted B foot numbness  This persisted about 2 wks and is now resolved  It coincided with worsening BLE edema and her diuretics were adjusted with subsequent decrease in swelling  She wears compression daily but is not wearing them today  She elevates sometimes  She can walk around the grocery store with a cart without issue  She denies claudication, rest pain, or wounds  She does complain of random muscle spasms, back pain, point tenderness of her R medial thigh, remote from incision sites and ankles tenderness  She thinks that the bypass graft is too tight  Denies any TIA/CVA symptoms         The following portions of the patient's history were reviewed and updated as appropriate: allergies, current medications, past family history, past medical history, past social history, past surgical history and problem list     Review of Systems   Constitutional: Negative  HENT: Negative  Eyes: Positive for itching  Respiratory: Negative  SOB with activity   Cardiovascular: Positive for palpitations and leg swelling  Gastrointestinal: Negative  Endocrine: Positive for cold intolerance and heat intolerance  Genitourinary: Negative  Musculoskeletal: Positive for arthralgias, back pain and myalgias  Leg cramping with walking   Skin: Negative  Negative for wound  Allergic/Immunologic: Positive for environmental allergies  Neurological: Positive for numbness  Hematological: Bruises/bleeds easily  Psychiatric/Behavioral: The patient is nervous/anxious           Depression         Objective:      /80 (BP Location: Left arm, Patient Position: Sitting, Cuff Size: Adult)   Pulse 78   Temp 97 9 °F (36 6 °C) (Tympanic) Resp 16   Ht 5' 5" (1 651 m)   Wt 75 3 kg (166 lb)   BMI 27 62 kg/m²          Physical Exam   Constitutional: She is oriented to person, place, and time  She appears well-developed and well-nourished  HENT:   Head: Normocephalic and atraumatic  Eyes: Conjunctivae are normal    Neck: Normal range of motion  Neck supple  Cardiovascular: Normal rate, regular rhythm and normal heart sounds  No murmur heard  Pulses:       Radial pulses are 2+ on the right side, and 2+ on the left side  Femoral pulses are 2+ on the right side, and 1+ on the left side  Dorsalis pedis pulses are 0 on the right side, and 1+ on the left side  Posterior tibial pulses are 0 on the right side, and 0 on the left side  2+ R graft pulse  Strong bypass/DP/PT signals B  No carotid bruits B   Pulmonary/Chest: Effort normal and breath sounds normal  No respiratory distress  Abdominal: Soft  She exhibits no distension  There is no tenderness  There is no rebound  Musculoskeletal: Normal range of motion  She exhibits no edema (BLE edema 2+ pitting, worst at feet and ankles and lower legs, sparing the thighs)  Neurological: She is alert and oriented to person, place, and time  Skin: Skin is warm and dry  B ankles with diffuse spiders; no large varicosities   Psychiatric: She has a normal mood and affect  Her behavior is normal    Nursing note and vitals reviewed          Vitals:    06/18/18 1114   BP: 160/80   BP Location: Left arm   Patient Position: Sitting   Cuff Size: Adult   Pulse: 78   Resp: 16   Temp: 97 9 °F (36 6 °C)   TempSrc: Tympanic   Weight: 75 3 kg (166 lb)   Height: 5' 5" (1 651 m)       Patient Active Problem List   Diagnosis    Asymptomatic bilateral carotid artery stenosis    Bilateral edema of lower extremity    Chronic diastolic CHF (congestive heart failure) (HCC)    Chronic kidney disease, stage 3    Dyslipidemia    Benign essential hypertension    Hypothyroidism    Peripheral arterial disease ([facility] )    Congenital hypothyroidism without goiter    Aortoiliac occlusive disease ([facility] )       Past Surgical History:   Procedure Laterality Date    ABDOMINAL AORTA STENT      ANGIOPLASTY Left 2012    left leg    ARTERIAL BYPASS SURGERY Left 2012    left leg    ARTERIAL BYPASS SURGERY Right 2012    [address]leg   [address]    LUMBAR SPINE SURGERY  2011    MYOMECTOMY  1980    OVARIAN CYST REMOVAL  1976       Family History   Problem Relation Age of Onset    Heart failure Mother     Heart attack Father        Social History     Social History    Marital status:      Spouse name: N/A    Number of children: N/A    Years of education: N/A     Occupational History    Not on file  Social History Main Topics    Smoking status: Never Smoker    Smokeless tobacco: Never Used    Alcohol use No    Drug use: No    Sexual activity: Not on file     Other Topics Concern    Not on file     Social History Narrative    No narrative on file       Allergies   Allergen Reactions    Hydrochlorothiazide Shortness Of Breath    Metoprolol Other (See Comments) and Shortness Of Breath     WEIGHT GAIN    Atorvastatin      Other reaction(s):  Other (See Comments)  Muscle spasms    Beta Adrenergic Blockers GI Intolerance    Other Itching and Sneezing     Dust, pollen, maple trees    Penicillins Rash and Other (See Comments)     lump         Current Outpatient Prescriptions:     ALPRAZolam (XANAX) 0 25 mg tablet, Take by mouth daily at bedtime as needed for anxiety, Disp: , Rfl:     aspirin (ECOTRIN LOW STRENGTH) 81 mg EC tablet, Take 81 mg by mouth daily, Disp: , Rfl:     Corn Dextrin (EASY FIBER PO), Take by mouth, Disp: , Rfl:     furosemide (LASIX) 20 mg tablet, Take 20 mg by mouth 2 (two) times a day, Disp: , Rfl:     levothyroxine 75 mcg tablet, Take 75 mcg by mouth daily, Disp: , Rfl:     lisinopril (ZESTRIL) 40 mg tablet, Take 40 mg by mouth daily, Disp: , Rfl:    multivitamin (THERAGRAN) TABS, Take 1 tablet by mouth daily, Disp: , Rfl:     omega-3-acid ethyl esters (LOVAZA) 1 g capsule, Take 2 g by mouth 2 (two) times a day, Disp: , Rfl:     simvastatin (ZOCOR) 40 mg tablet, Take 40 mg by mouth daily at bedtime, Disp: , Rfl:     spironolactone (ALDACTONE) 25 mg tablet, Take 25 mg by mouth daily, Disp: , Rfl:

## 2018-08-30 PROBLEM — I49.1 PREMATURE ATRIAL CONTRACTIONS: Status: ACTIVE | Noted: 2018-08-30

## 2018-08-31 ENCOUNTER — OFFICE VISIT (OUTPATIENT)
Dept: CARDIOLOGY CLINIC | Facility: CLINIC | Age: 83
End: 2018-08-31
Payer: COMMERCIAL

## 2018-08-31 VITALS
RESPIRATION RATE: 16 BRPM | WEIGHT: 139.4 LBS | HEIGHT: 65 IN | BODY MASS INDEX: 23.22 KG/M2 | HEART RATE: 78 BPM | DIASTOLIC BLOOD PRESSURE: 80 MMHG | SYSTOLIC BLOOD PRESSURE: 138 MMHG

## 2018-08-31 DIAGNOSIS — I49.1 PREMATURE ATRIAL CONTRACTIONS: ICD-10-CM

## 2018-08-31 DIAGNOSIS — E78.5 DYSLIPIDEMIA: ICD-10-CM

## 2018-08-31 DIAGNOSIS — I10 BENIGN ESSENTIAL HYPERTENSION: ICD-10-CM

## 2018-08-31 DIAGNOSIS — I65.23 ASYMPTOMATIC BILATERAL CAROTID ARTERY STENOSIS: ICD-10-CM

## 2018-08-31 DIAGNOSIS — I73.9 PERIPHERAL ARTERIAL DISEASE (HCC): ICD-10-CM

## 2018-08-31 DIAGNOSIS — I50.32 CHRONIC DIASTOLIC CHF (CONGESTIVE HEART FAILURE) (HCC): Primary | ICD-10-CM

## 2018-08-31 PROCEDURE — 99214 OFFICE O/P EST MOD 30 MIN: CPT | Performed by: INTERNAL MEDICINE

## 2018-08-31 RX ORDER — RANITIDINE 150 MG/1
150 TABLET ORAL 2 TIMES DAILY
COMMUNITY

## 2018-08-31 NOTE — PROGRESS NOTES
Cardiology Follow Up    Snodra Agee [facility]  1935  [id number]  [address]  [address] [facility] [address] [phone]    1  Chronic diastolic CHF (congestive heart failure) ([facility] [address] )     2  Benign essential hypertension     3  Dyslipidemia     4  Peripheral arterial disease ([facility] )     5  Asymptomatic bilateral carotid artery stenosis     6  Premature atrial contractions         Discussion/Summary:    [facility] is a pleasant 27-year-old female who presents to the office today for routine follow-up  Since her last visit she has been feeling well and offers no cardiac complaints today  Her blood pressure is well controlled in the office today  No changes were made to her antihypertensive medication regimen  Her most recent lipids were reviewed  In light of her vascular disease her LDL is suboptimal  She had nausea with atorvastatin  I did discuss changing to rosuvastatin and she declines  She would like to have her lipids reassessed as prescribed by her primary care provider and I will await those results  Regarding her heart failure, her volume status appears stable  She expresses concern over volume depletion as she notes a dry throat and mouth  She will attempt to decrease her Lasix to once a day dosing  I have asked that she weigh herself on a regular basis  If she notes any weight gain or symptoms suggestive of volume overload I have asked that she increase the Lasix to b i d  dosing again  No testing is advised  She'll follow-up in the office in six months or sooner if deemed necessary    Interval History:   Mrs  [facility] is a pleasant 27-year-old female who presents to the office today for routine follow-up      Since her last visit she has been feeling well  She is sedentary but able to complete modest housework without any chest discomfort or shortness of breath   She denies any signs or symptoms of congestive heart failure including progressive lower extremity edema, paroxysmal nocturnal dyspnea, orthopnea, weight gain or increasing abdominal girth  She does not weigh herself regularly  She sleeps with her legs propped on pillows and notes they look well in the morning but does note increased edema throughout the day  She wears compression stockings which helps the edema  She reports an occasional fluttering sensation in her chest  She denies symptoms of claudication  Problem List     Asymptomatic bilateral carotid artery stenosis    Bilateral edema of lower extremity    Chronic diastolic CHF (congestive heart failure) ([facility])    Chronic kidney disease, stage 3    Dyslipidemia    Benign essential hypertension    Hypothyroidism    Peripheral arterial disease ([place] [address] )    Overview Signed 5/1/2018 10:16 PM by Anthony Zaidi PA-C     s/p L fem-BK pop bypass w/GSV 1/19/12  s/p R CFA endarterectomy, fem-BK pop bypass w/GSV 9/25/12         Congenital hypothyroidism without goiter    Aortoiliac occlusive disease ([place] [address] )    Overview Signed 5/1/2018 10:14 PM by Anthony Zaidi PA-C     s/p Bilat EIA stents             No past medical history on file  Social History     Social History    Marital status:      Spouse name: N/A    Number of children: N/A    Years of education: N/A     Occupational History    Not on file       Social History Main Topics    Smoking status: Never Smoker    Smokeless tobacco: Never Used    Alcohol use No    Drug use: No    Sexual activity: Not on file     Other Topics Concern    Not on file     Social History Narrative    No narrative on file      Family History   Problem Relation Age of Onset    Heart failure Mother     Heart attack Father      Past Surgical History:   Procedure Laterality Date    ABDOMINAL AORTA STENT      ANGIOPLASTY Left 2012    left leg    ARTERIAL BYPASS SURGERY Left 2012    left leg    ARTERIAL BYPASS SURGERY Right 2012    rightleg    HEMORRHOID SURGERY [address]    MYOMECTOMY  1980    OVARIAN CYST REMOVAL  1976       Current Outpatient Prescriptions:     ALPRAZolam (XANAX) 0 25 mg tablet, Take by mouth daily at bedtime as needed for anxiety, Disp: , Rfl:     aspirin (ECOTRIN LOW STRENGTH) 81 mg EC tablet, Take 81 mg by mouth daily, Disp: , Rfl:     Corn Dextrin (EASY FIBER PO), Take by mouth, Disp: , Rfl:     furosemide (LASIX) 20 mg tablet, Take 20 mg by mouth 2 (two) times a day, Disp: , Rfl:     levothyroxine 75 mcg tablet, Take 75 mcg by mouth daily, Disp: , Rfl:     lisinopril (ZESTRIL) 40 mg tablet, Take 40 mg by mouth daily, Disp: , Rfl:     multivitamin (THERAGRAN) TABS, Take 1 tablet by mouth daily, Disp: , Rfl:     Omega-3 Fatty Acids (FISH OIL) 1200 MG CPDR, Take by mouth, Disp: , Rfl:     ranitidine (ZANTAC) 150 mg tablet, Take 150 mg by mouth 2 (two) times a day, Disp: , Rfl:     simvastatin (ZOCOR) 40 mg tablet, Take 40 mg by mouth daily at bedtime, Disp: , Rfl:     spironolactone (ALDACTONE) 25 mg tablet, Take 25 mg by mouth daily, Disp: , Rfl:   Allergies   Allergen Reactions    Hydrochlorothiazide Shortness Of Breath    Metoprolol Other (See Comments) and Shortness Of Breath     WEIGHT GAIN    Atorvastatin      Other reaction(s):  Other (See Comments)  Muscle spasms    Beta Adrenergic Blockers GI Intolerance    Other Itching and Sneezing     Dust, pollen, maple trees    Penicillins Rash and Other (See Comments)     lump       Labs:     Chemistry        Component Value Date/Time     04/18/2018 1014    BUN 51 (H) 04/18/2018 1014    CREATININE 2 83 (H) 04/18/2018 1014        Component Value Date/Time    CALCIUM 9 7 04/18/2018 1014    ALKPHOS 55 04/18/2018 1014            No results found for: CHOL  Lab Results   Component Value Date    HDL 41 (L) 04/18/2018     No results found for: [address]  Lab Results   Component Value Date    TRIG 153 (H) 04/18/2018     No components found for: CHOLHDL    Imaging: No results found  Review of Systems   Constitution: Positive for decreased appetite  Musculoskeletal: Positive for back pain  Gastrointestinal: Positive for constipation  All other systems reviewed and are negative  Vitals:    08/31/18 1019   BP: 138/80   Pulse: 78   Resp: 16     Vitals:    08/31/18 1019   Weight: 63 2 kg (139 lb 6 4 oz)     Height: 5' 5" (165 1 cm)   Body mass index is 23 2 kg/m²      Physical Exam:   General appearance:  Appears stated age, alert, well appearing and in no distress  HEENT:  PERRLA, EOMI, no scleral icterus, no conjunctival pallor  NECK:  Supple, No elevated JVP, no thyromegaly, no carotid bruits  HEART:  Regular rate and rhythm, normal S1/S2, no S3/S4, no murmur or rub  LUNGS:  Clear to auscultation bilaterally  ABDOMEN:  Soft, non-tender, positive bowel sounds, no rebound or guarding, no organomegaly   EXTREMITIES:   Pedal edema with compression stockings in place  VASCULAR:  Normal pedal pulses   SKIN: No lesions or rashes on exposed skin  NEURO:  CN II-XII intact, no focal deficits

## 2018-10-12 DIAGNOSIS — I73.9 PERIPHERAL VASCULAR DISEASE (HCC): ICD-10-CM

## 2018-11-25 ENCOUNTER — TELEPHONE (OUTPATIENT)
Dept: OTHER | Facility: OTHER | Age: 83
End: 2018-11-25

## 2018-11-25 NOTE — TELEPHONE ENCOUNTER
Juan Miguel Wolfe Span 1935  CONFIDENTIALTY NOTICE: This fax transmission is intended only for the addressee  It contains information that is legally privileged,  confidential or otherwise protected from use or disclosure  If you are not the intended recipient, you are strictly prohibited from reviewing,  disclosing, copying using or disseminating any of this information or taking any action in reliance on or regarding this information  If you have  received this fax in error, please notify us immediately by telephone so that we can arrange for its return to us  Page: 1  2  Call Id: [id number]  Health Call  Standard Call Report  Health Call  Patient Name: Manfred Thurman  Gender: Female  : 1935  Age: 82 Y 5 M 34 D  Return Phone  Number: [phone] (Home)  Address: [address] [place] [address]/State/Zip: [facility]  Practice Name: [address]:  Physician:  [facility] Name:  Relationship To  Patient: Self  Return Phone Number: [phone] (Home)  Presenting Problem: "I woke up from a nap and could not  move my feet  I can move my feet  now "  Service Type: Triage  Charged Service 1: N/A  Pharmacy Name and  Number:  Nurse Assessment  Nurse: Sarahy Jones RN, Tavares Avina Date/Time: 2018 4:08:36 PM  Type of assessment required:  ---General (Adult or Child)  Duration of Current S/S  ---Today  Location/Radiation  ---Rt foot  Temperature (F) and route:  ---Denies fever  Symptom Specific Meds (Dose/Time):  ---None  Other S/S  ---Could not move my Rt foot   I was able to move it again within a few seconds  No  numbness, no lose of feeling, remained warm  Pain Scale on scale of 1-10, 10 being the worst:  ---None  Symptom progression:  ---better  Intake and Output  ---WNL  Terra Span 1935  CONFIDENTIALTY NOTICE: This fax transmission is intended only for the addressee  It contains information that is legally privileged,  confidential or otherwise protected from use or disclosure   If you are not the intended recipient, you are strictly prohibited from reviewing,  disclosing, copying using or disseminating any of this information or taking any action in reliance on or regarding this information  If you have  received this fax in error, please notify us immediately by telephone so that we can arrange for its return to us  Page: 2 of 2  Call Id: [id number]  Nurse Assessment  Last Exam/Treatment:  ---Was last seen 6/13/2018  Protocols  Protocol Title Nurse Date/Time  No Guideline or Reference Available Etta Tucker 11/25/2018 4:13:49 PM  Question Caller Affirmed  Disp  Time Disposition Final User  11/25/2018 4:15:34 PM See Physician within [address] [facility], LANETTE, Patricio   11/25/2018 4:15:57 PM RN Triaged Yes Carolyn Batista RN, [facility] Advice Given Per Protocol  SEE PHYSICIAN WITHIN 24 HOURS: * IF OFFICE WILL BE OPEN: You need to be seen within the next 24 hours  Call your doctor  when the office opens, and make an appointment  CALL BACK IF: * You become worse * New symptoms develop  CARE ADVICE per  nurse's experience and judgement    Caller Understands: Yes  Caller Disagree/Comply: Comply  PreDisposition: Unsure

## 2018-11-26 NOTE — TELEPHONE ENCOUNTER
I spoke to patient , she thinks she was dreaming , both her feet are fine and if it happens again, she will call us

## 2018-11-26 NOTE — TELEPHONE ENCOUNTER
Received message from [facility] that pt called back  I attempted again to call her  LMOM

## 2020-01-30 ENCOUNTER — TELEPHONE (OUTPATIENT)
Dept: VASCULAR SURGERY | Facility: CLINIC | Age: 85
End: 2020-01-30

## 2020-01-30 DIAGNOSIS — I71.4 ABDOMINAL AORTIC ANEURYSM WITHOUT RUPTURE (HCC): Primary | ICD-10-CM

## 2020-01-30 DIAGNOSIS — I73.9 PERIPHERAL ARTERIAL DISEASE (HCC): ICD-10-CM

## 2020-01-30 NOTE — TELEPHONE ENCOUNTER
Patient called in to advise she wants to wait until she sees her primary care doctor in May to schedule anything   She states she will call us back

## 2025-01-13 NOTE — TELEPHONE ENCOUNTER
Midline dressing removed per verbal order, MD present at removal.  Cleaned with normal saline and left open to air per MD.  Drain sponge placed at JUNITO drain site.  Pateint tolerated well.    Pt called back and left message for us  She said she "is fine and spoke to on call nurse last night   Not having any symptoms"